# Patient Record
Sex: MALE | Race: BLACK OR AFRICAN AMERICAN | URBAN - METROPOLITAN AREA
[De-identification: names, ages, dates, MRNs, and addresses within clinical notes are randomized per-mention and may not be internally consistent; named-entity substitution may affect disease eponyms.]

---

## 2019-06-04 ENCOUNTER — IMPORTED ENCOUNTER (OUTPATIENT)
Dept: URBAN - METROPOLITAN AREA CLINIC 9 | Facility: CLINIC | Age: 39
End: 2019-06-04

## 2019-10-17 NOTE — PATIENT DISCUSSION
Continue: Pred Forte (prednisolone acetate): drops,suspension: 1% 1 drop four times a day as directed 10-

## 2019-10-17 NOTE — PATIENT DISCUSSION
MYOPIA, OU- DISC OPT OF REFRACTIVE [id number]. PT UNDERSTANDS OPTIONS AND DESIRES TO PROCEED WITH LASIK OU TO IMPROVE VA AND REDUCE DEPENDENCY ON GLS/CTLS.

## 2019-10-17 NOTE — PATIENT DISCUSSION
Continue: Zymaxid (gatifloxacin): drops: 0.5% 1 drop four times a day as directed into both eyes 10-

## 2019-10-17 NOTE — PATIENT DISCUSSION
SAME DAY POST LASIK OU -  DOING WELL. STRESSED IMPORTANCE OF FOLLOWING STRICT MEDICATION LIST. RELEASED TO FOLLOW UP CARE W/ DR. Venkat Klein.

## 2020-07-20 ENCOUNTER — IMPORTED ENCOUNTER (OUTPATIENT)
Dept: URBAN - METROPOLITAN AREA CLINIC 9 | Facility: CLINIC | Age: 40
End: 2020-07-20

## 2020-07-29 ENCOUNTER — IMPORTED ENCOUNTER (OUTPATIENT)
Dept: URBAN - METROPOLITAN AREA CLINIC 9 | Facility: CLINIC | Age: 40
End: 2020-07-29

## 2020-08-13 ENCOUNTER — IMPORTED ENCOUNTER (OUTPATIENT)
Dept: URBAN - METROPOLITAN AREA CLINIC 9 | Facility: CLINIC | Age: 40
End: 2020-08-13

## 2020-09-09 ENCOUNTER — IMPORTED ENCOUNTER (OUTPATIENT)
Dept: URBAN - METROPOLITAN AREA CLINIC 9 | Facility: CLINIC | Age: 40
End: 2020-09-09

## 2020-12-04 ENCOUNTER — IMPORTED ENCOUNTER (OUTPATIENT)
Dept: URBAN - METROPOLITAN AREA CLINIC 9 | Facility: CLINIC | Age: 40
End: 2020-12-04

## 2021-10-16 ASSESSMENT — VISUAL ACUITY
OD_SC: CF 2FT SN
OS_SC: CF 1FT SN
OS_SC: 20/400 SN
OD_SC: 20/25 - SN
OS_SC: CF 2FT SN
OS_CC: 20/25 - SN
OD_SC: 20/80 SN
OD_SC: 20/25 - SN
OD_CC: 20/25 + SN
OD_SC: CF 1FT SN
OS_SC: 20/200 SN
OD_CC: 20/20 SN

## 2021-10-16 ASSESSMENT — KERATOMETRY
OD_K1POWER_DIOPTERS: 41.75
OD_K2POWER_DIOPTERS: 42.5
OD_K2POWER_DIOPTERS: 42.5
OS_AXISANGLE2_DEGREES: 179
OD_K1POWER_DIOPTERS: 41.875
OD_AXISANGLE2_DEGREES: 104
OS_K1POWER_DIOPTERS: 41.25
OS_AXISANGLE_DEGREES: 92
OS_K1POWER_DIOPTERS: 41.5
OS_AXISANGLE_DEGREES: 89
OD_AXISANGLE_DEGREES: 14
OD_AXISANGLE_DEGREES: 146
OS_AXISANGLE2_DEGREES: 2
OS_K2POWER_DIOPTERS: 42.75
OS_K2POWER_DIOPTERS: 42.625
OD_AXISANGLE2_DEGREES: 56

## 2021-10-16 ASSESSMENT — TONOMETRY
OD_IOP_MMHG: 24
OD_IOP_MMHG: 20
OD_IOP_MMHG: 20
OS_IOP_MMHG: 18
OS_IOP_MMHG: 23
OS_IOP_MMHG: 21
OD_IOP_MMHG: 18
OD_IOP_MMHG: 18
OS_IOP_MMHG: 20

## 2022-06-22 RX ORDER — PRAVASTATIN SODIUM 20 MG
TABLET ORAL
COMMUNITY
Start: 2021-08-10

## 2022-06-22 RX ORDER — ZOLPIDEM TARTRATE 10 MG/1
TABLET ORAL
COMMUNITY
Start: 2022-04-20

## 2022-06-22 RX ORDER — ATORVASTATIN CALCIUM 40 MG/1
TABLET, FILM COATED ORAL
COMMUNITY

## 2022-06-22 RX ORDER — LORAZEPAM 1 MG/1
TABLET ORAL
COMMUNITY
Start: 2021-12-13

## 2022-06-22 RX ORDER — TADALAFIL 20 MG/1
TABLET ORAL
COMMUNITY
Start: 2021-07-07

## 2022-06-22 RX ORDER — ONDANSETRON 8 MG/1
TABLET, ORALLY DISINTEGRATING ORAL
COMMUNITY
Start: 2021-07-02

## 2022-06-22 RX ORDER — INSULIN ASPART 100 [IU]/ML
INJECTION, SUSPENSION SUBCUTANEOUS
COMMUNITY

## 2022-06-22 RX ORDER — HYDROCHLOROTHIAZIDE 25 MG/1
TABLET ORAL
COMMUNITY

## 2022-06-22 RX ORDER — DOXAZOSIN 2 MG/1
TABLET ORAL
COMMUNITY
Start: 2022-04-20

## 2022-06-22 RX ORDER — LISINOPRIL 20 MG/1
TABLET ORAL
COMMUNITY